# Patient Record
Sex: FEMALE | Race: BLACK OR AFRICAN AMERICAN | NOT HISPANIC OR LATINO | Employment: FULL TIME | URBAN - METROPOLITAN AREA
[De-identification: names, ages, dates, MRNs, and addresses within clinical notes are randomized per-mention and may not be internally consistent; named-entity substitution may affect disease eponyms.]

---

## 2024-08-04 ENCOUNTER — OFFICE VISIT (OUTPATIENT)
Dept: URGENT CARE | Facility: URGENT CARE | Age: 39
End: 2024-08-04
Payer: COMMERCIAL

## 2024-08-04 VITALS
TEMPERATURE: 97 F | SYSTOLIC BLOOD PRESSURE: 120 MMHG | DIASTOLIC BLOOD PRESSURE: 78 MMHG | WEIGHT: 191 LBS | HEART RATE: 71 BPM | OXYGEN SATURATION: 99 %

## 2024-08-04 DIAGNOSIS — N89.8 VAGINAL ITCHING: Primary | ICD-10-CM

## 2024-08-04 LAB
ALBUMIN UR-MCNC: NEGATIVE MG/DL
APPEARANCE UR: ABNORMAL
BILIRUB UR QL STRIP: NEGATIVE
CLUE CELLS: NORMAL
COLOR UR AUTO: YELLOW
GLUCOSE UR STRIP-MCNC: NEGATIVE MG/DL
HGB UR QL STRIP: ABNORMAL
KETONES UR STRIP-MCNC: NEGATIVE MG/DL
LEUKOCYTE ESTERASE UR QL STRIP: NEGATIVE
NITRATE UR QL: NEGATIVE
PH UR STRIP: 5.5 [PH] (ref 5–7)
RBC #/AREA URNS AUTO: ABNORMAL /HPF
SP GR UR STRIP: >=1.03 (ref 1–1.03)
SQUAMOUS #/AREA URNS AUTO: ABNORMAL /LPF
TRICHOMONAS, WET PREP: NORMAL
UROBILINOGEN UR STRIP-ACNC: 1 E.U./DL
WBC #/AREA URNS AUTO: ABNORMAL /HPF
WBC'S/HIGH POWER FIELD, WET PREP: NORMAL
YEAST, WET PREP: NORMAL

## 2024-08-04 PROCEDURE — 99203 OFFICE O/P NEW LOW 30 MIN: CPT | Performed by: PHYSICIAN ASSISTANT

## 2024-08-04 PROCEDURE — 87591 N.GONORRHOEAE DNA AMP PROB: CPT | Performed by: PHYSICIAN ASSISTANT

## 2024-08-04 PROCEDURE — 81001 URINALYSIS AUTO W/SCOPE: CPT

## 2024-08-04 PROCEDURE — 87210 SMEAR WET MOUNT SALINE/INK: CPT

## 2024-08-04 PROCEDURE — 87491 CHLMYD TRACH DNA AMP PROBE: CPT | Performed by: PHYSICIAN ASSISTANT

## 2024-08-04 RX ORDER — CIPROFLOXACIN 500 MG/1
1 TABLET, FILM COATED ORAL 2 TIMES DAILY
COMMUNITY
End: 2024-08-04

## 2024-08-04 RX ORDER — AMOXICILLIN 875 MG
875 TABLET ORAL 2 TIMES DAILY
COMMUNITY
End: 2024-08-04

## 2024-08-04 RX ORDER — CEPHALEXIN 500 MG/1
500 CAPSULE ORAL 2 TIMES DAILY
COMMUNITY
End: 2024-08-04

## 2024-08-04 RX ORDER — FLUCONAZOLE 150 MG/1
150 TABLET ORAL
Qty: 3 TABLET | Refills: 0 | Status: SHIPPED | OUTPATIENT
Start: 2024-08-04

## 2024-08-04 NOTE — PROGRESS NOTES
SUBJECTIVE:  Donna Juan is a 38 year old female comes in with concerns for yeast infection.  Patient states that for the past 3 to 4 days she has had itching along with vaginal irritation and a small amount of white discharge.  She just completed a course of antibiotics for UTI then symptoms develop.  She has tried over-the-counter yeast cream with no improvement last dosing last night but did put topical cream on today.  No concerns for STDs but would like to be tested.  She denies any other symptoms at this time.    No past medical history on file.  There is no problem list on file for this patient.    No current outpatient medications on file.     No current facility-administered medications for this visit.     Social History     Socioeconomic History    Marital status: Single     Spouse name: Not on file    Number of children: Not on file    Years of education: Not on file    Highest education level: Not on file   Occupational History    Not on file   Tobacco Use    Smoking status: Not on file    Smokeless tobacco: Not on file   Substance and Sexual Activity    Alcohol use: Not on file    Drug use: Not on file    Sexual activity: Not on file   Other Topics Concern    Not on file   Social History Narrative    Not on file     Social Determinants of Health     Financial Resource Strain: Medium Risk (3/27/2023)    Received from [facility]     Overall Financial Resource Strain (CARDIA)     Difficulty of Paying Living Expenses: Somewhat hard   Food Insecurity: Not on file   Transportation Needs: Not on file   Physical Activity: Inactive (3/27/2023)    Received from [facility]     Exercise Vital Sign     Days of Exercise per Week: 0 days     Minutes of Exercise per Session: 0 min   Stress: Stress Concern Present (3/27/2023)    Received from [facility]     [place] [employer] - Occupational Stress Questionnaire     Feeling of Stress : Very much   Social  Connections: Unknown (5/22/2023)    Received from [facility] [facility] [employer] [facility]    Social Connections     Frequency of Communication with Friends and Family: Not on file   Interpersonal Safety: Not on file   Housing Stability: Not on file     ROS negative other than stated above    Exam:  GENERAL APPEARANCE: healthy, alert and no distress  RESP: lungs clear to auscultation - no rales, rhonchi or wheezes  CV: regular rates and rhythm, normal S1 S2, no S3 or S4 and no murmur, click or rub -  ABDOMEN: Nontender no CVA tenderness  GU_female: Self swab obtained  SKIN: no suspicious lesions or rashes    Results for orders placed or performed in visit on 08/04/24   UA Macroscopic with reflex to Microscopic and Culture - Clinic Collect     Status: Abnormal    Specimen: Urine, Midstream   Result Value Ref Range    Color Urine Yellow Colorless, Straw, Light Yellow, Yellow    Appearance Urine Slightly Cloudy (A) Clear    Glucose Urine Negative Negative mg/dL    Bilirubin Urine Negative Negative    Ketones Urine Negative Negative mg/dL    Specific Gravity Urine >=1.030 1.003 - 1.035    Blood Urine Trace (A) Negative    pH Urine 5.5 5.0 - 7.0    Protein Albumin Urine Negative Negative mg/dL    Urobilinogen Urine 1.0 0.2, 1.0 E.U./dL    Nitrite Urine Negative Negative    Leukocyte Esterase Urine Negative Negative   UA Microscopic with Reflex to Culture     Status: Abnormal   Result Value Ref Range    RBC Urine None Seen 0-2 /HPF /HPF    WBC Urine 0-5 0-5 /HPF /HPF    Squamous Epithelials Urine Moderate (A) None Seen /LPF    Narrative    Urine Culture not indicated   Wet prep - Clinic Collect     Status: Normal    Specimen: Vagina; Swab   Result Value Ref Range    Trichomonas Absent Absent    Yeast Absent Absent    Clue Cells Absent Absent    WBCs/high power field None None     GC - pending results.      assessment/plan:  (N89.8) Vaginal itching  (primary encounter diagnosis)  Comment:   Plan: Wet prep - Clinic  Collect, UA Macroscopic with         reflex to Microscopic and Culture - Clinic         Collect, Chlamydia & Gonorrhea by PCR,         GICH/Range - Clinic Collect, UA Microscopic         with Reflex to Culture          Patient with vaginal itching along with discharge in setting of recent antibiotic use.  She has used some over-the-counter medication with minimal relief.  Urine is clear.  No clear yeast infection based off the wet prep but may be partial treatment and risk due to recent antibiotic use.  Will cover with Diflucan.  Hygiene measures were reviewed.  GC is pending.  Will follow-up with primary if symptoms worsen or new symptoms develop.

## 2024-08-05 ENCOUNTER — NURSE TRIAGE (OUTPATIENT)
Dept: PEDIATRICS | Facility: CLINIC | Age: 39
End: 2024-08-05
Payer: COMMERCIAL

## 2024-08-05 LAB
C TRACH DNA SPEC QL PROBE+SIG AMP: NEGATIVE
N GONORRHOEA DNA SPEC QL NAA+PROBE: NEGATIVE

## 2024-08-05 NOTE — TELEPHONE ENCOUNTER
Seen in UC yesterday. Brown discharge with small clots, small pea size elongated today.     Has abdominal pain above pelvic bone, started today.     Last period was 7-8 months.    She is unsure if this is the start of a period.    Patient reports she has been using the   3 day vaginal cream 2% for the outer itch but wondering if there is anything else she could try. She itched and her skin is raw and burns with urination.     Advised could try a water bottle and then patting after urinating. Advised to keep the area dry.      IF not better in a few days, recommended to see PCP. Patient voiced understanding and agreement with plan.    ANGELICA Coreas on 8/5/2024 at 5:05 PM      Reason for Disposition   All other vaginal symptoms  (Exception: Feels like prior yeast infection, minor abrasion, mild rash < 24 hour duration, mild itching.)    Additional Information   Negative: Followed a genital area injury (e.g., vagina, vulva)   Negative: Vaginal bleeding is main symptom   Negative: Vaginal discharge is main symptom   Negative: Pain or burning with passing urine (urination) is main symptom   Negative: Menstrual cramps is main symptom   Negative: Abdomen pain is main symptom   Negative: Pubic lice suspected   Negative: Itching or rash of female genital area (e.g., labia, vagina, vulva)   Negative: Pregnant and labor suspected   Negative: Sounds like a life-threatening emergency to the triager   Negative: Patient sounds very sick or weak to the triager   Negative: SEVERE pain and not improved 2 hours after pain medicine   Negative: Genital area looks infected (e.g., draining sore, spreading redness) and fever   Negative: Something is hanging out of the vagina and can't easily be pushed back inside   Negative: MILD-MODERATE pain and present > 24 hours  (Exception: Chronic pain.)   Negative: Genital area looks infected (e.g., draining sore, spreading redness)   Negative: Rash with painful tiny water blisters   Negative:  "MODERATE-SEVERE itching (i.e., interferes with school, work, or sleep)   Negative: Rash (e.g., redness, tiny bumps, sore) of genital area and present > 24 hours   Negative: Tender lump (swelling or \"ball\") at vaginal opening   Negative: Symptoms of a yeast infection (i.e., itchy, white discharge, not bad smelling) and not improved > 3 days following Care Advice   Negative: Vaginal itching and not improved > 3 days following Care Advice   Negative: Vaginal odor (bad smell) not improved > 3 days following Care Advice   Negative: Patient is worried they have a sexually transmitted infection (STI)   Negative: Patient wants to be seen   Negative: Feels like something inside is falling out of vagina (e.g., pressure, heaviness, fullness)   Negative: Vaginal dryness or itching and nearing menopause or after menopause   Negative: Pain with sexual intercourse (dyspareunia)  (Exception: Feels like prior yeast infection, minor abrasion, minor rash < 24 hour duration, mild itching.)   Negative: Pain in genital area is a chronic symptom (recurrent or ongoing AND present > 4 weeks)    Protocols used: Vaginal Symptoms-A-OH    "

## 2024-09-30 ENCOUNTER — HOSPITAL ENCOUNTER (EMERGENCY)
Facility: CLINIC | Age: 39
Discharge: HOME | End: 2024-09-30
Attending: EMERGENCY MEDICINE | Admitting: EMERGENCY MEDICINE
Payer: COMMERCIAL

## 2024-09-30 VITALS
TEMPERATURE: 98.1 F | WEIGHT: 190 LBS | BODY MASS INDEX: 35.87 KG/M2 | RESPIRATION RATE: 18 BRPM | HEIGHT: 61 IN | HEART RATE: 54 BPM | OXYGEN SATURATION: 100 % | SYSTOLIC BLOOD PRESSURE: 105 MMHG | DIASTOLIC BLOOD PRESSURE: 73 MMHG

## 2024-09-30 DIAGNOSIS — M62.830 BACK MUSCLE SPASM: ICD-10-CM

## 2024-09-30 LAB
ANION GAP SERPL CALCULATED.3IONS-SCNC: 13 MMOL/L (ref 7–15)
BASOPHILS # BLD AUTO: 0 10E3/UL (ref 0–0.2)
BASOPHILS NFR BLD AUTO: 0 %
BUN SERPL-MCNC: 16 MG/DL (ref 6–20)
CALCIUM SERPL-MCNC: 8.8 MG/DL (ref 8.8–10.4)
CHLORIDE SERPL-SCNC: 105 MMOL/L (ref 98–107)
CREAT SERPL-MCNC: 1.06 MG/DL (ref 0.51–0.95)
EGFRCR SERPLBLD CKD-EPI 2021: 68 ML/MIN/1.73M2
EOSINOPHIL # BLD AUTO: 0.1 10E3/UL (ref 0–0.7)
EOSINOPHIL NFR BLD AUTO: 1 %
ERYTHROCYTE [DISTWIDTH] IN BLOOD BY AUTOMATED COUNT: 12.6 % (ref 10–15)
GLUCOSE SERPL-MCNC: 120 MG/DL (ref 70–99)
HCO3 SERPL-SCNC: 20 MMOL/L (ref 22–29)
HCT VFR BLD AUTO: 39.7 % (ref 35–47)
HGB BLD-MCNC: 13 G/DL (ref 11.7–15.7)
IMM GRANULOCYTES # BLD: 0 10E3/UL
IMM GRANULOCYTES NFR BLD: 0 %
LYMPHOCYTES # BLD AUTO: 3.4 10E3/UL (ref 0.8–5.3)
LYMPHOCYTES NFR BLD AUTO: 49 %
MCH RBC QN AUTO: 33.1 PG (ref 26.5–33)
MCHC RBC AUTO-ENTMCNC: 32.7 G/DL (ref 31.5–36.5)
MCV RBC AUTO: 101 FL (ref 78–100)
MONOCYTES # BLD AUTO: 0.6 10E3/UL (ref 0–1.3)
MONOCYTES NFR BLD AUTO: 8 %
NEUTROPHILS # BLD AUTO: 2.8 10E3/UL (ref 1.6–8.3)
NEUTROPHILS NFR BLD AUTO: 41 %
NRBC # BLD AUTO: 0 10E3/UL
NRBC BLD AUTO-RTO: 0 /100
PLATELET # BLD AUTO: 263 10E3/UL (ref 150–450)
POTASSIUM SERPL-SCNC: 3.8 MMOL/L (ref 3.4–5.3)
RBC # BLD AUTO: 3.93 10E6/UL (ref 3.8–5.2)
SODIUM SERPL-SCNC: 138 MMOL/L (ref 135–145)
TROPONIN T SERPL HS-MCNC: <6 NG/L
WBC # BLD AUTO: 6.9 10E3/UL (ref 4–11)

## 2024-09-30 PROCEDURE — 80048 BASIC METABOLIC PNL TOTAL CA: CPT | Performed by: EMERGENCY MEDICINE

## 2024-09-30 PROCEDURE — 250N000011 HC RX IP 250 OP 636: Performed by: EMERGENCY MEDICINE

## 2024-09-30 PROCEDURE — 250N000013 HC RX MED GY IP 250 OP 250 PS 637: Performed by: EMERGENCY MEDICINE

## 2024-09-30 PROCEDURE — 99284 EMERGENCY DEPT VISIT MOD MDM: CPT | Mod: 25

## 2024-09-30 PROCEDURE — 93005 ELECTROCARDIOGRAM TRACING: CPT

## 2024-09-30 PROCEDURE — 36415 COLL VENOUS BLD VENIPUNCTURE: CPT | Performed by: EMERGENCY MEDICINE

## 2024-09-30 PROCEDURE — 96375 TX/PRO/DX INJ NEW DRUG ADDON: CPT

## 2024-09-30 PROCEDURE — 85025 COMPLETE CBC W/AUTO DIFF WBC: CPT | Performed by: EMERGENCY MEDICINE

## 2024-09-30 PROCEDURE — 96374 THER/PROPH/DIAG INJ IV PUSH: CPT

## 2024-09-30 PROCEDURE — 84484 ASSAY OF TROPONIN QUANT: CPT | Performed by: EMERGENCY MEDICINE

## 2024-09-30 RX ORDER — ACETAMINOPHEN 500 MG
1000 TABLET ORAL ONCE
Status: COMPLETED | OUTPATIENT
Start: 2024-09-30 | End: 2024-09-30

## 2024-09-30 RX ORDER — LIDOCAINE 4 G/G
1 PATCH TOPICAL ONCE
Status: DISCONTINUED | OUTPATIENT
Start: 2024-09-30 | End: 2024-09-30 | Stop reason: HOSPADM

## 2024-09-30 RX ORDER — CYCLOBENZAPRINE HCL 10 MG
10 TABLET ORAL ONCE
Status: COMPLETED | OUTPATIENT
Start: 2024-09-30 | End: 2024-09-30

## 2024-09-30 RX ORDER — HYDROMORPHONE HYDROCHLORIDE 1 MG/ML
0.5 INJECTION, SOLUTION INTRAMUSCULAR; INTRAVENOUS; SUBCUTANEOUS ONCE
Status: COMPLETED | OUTPATIENT
Start: 2024-09-30 | End: 2024-09-30

## 2024-09-30 RX ORDER — KETOROLAC TROMETHAMINE 15 MG/ML
15 INJECTION, SOLUTION INTRAMUSCULAR; INTRAVENOUS ONCE
Status: COMPLETED | OUTPATIENT
Start: 2024-09-30 | End: 2024-09-30

## 2024-09-30 RX ORDER — CYCLOBENZAPRINE HCL 10 MG
10 TABLET ORAL 3 TIMES DAILY PRN
Qty: 20 TABLET | Refills: 0 | Status: SHIPPED | OUTPATIENT
Start: 2024-09-30 | End: 2024-10-07

## 2024-09-30 RX ADMIN — HYDROMORPHONE HYDROCHLORIDE 0.5 MG: 1 INJECTION, SOLUTION INTRAMUSCULAR; INTRAVENOUS; SUBCUTANEOUS at 19:44

## 2024-09-30 RX ADMIN — KETOROLAC TROMETHAMINE 15 MG: 15 INJECTION, SOLUTION INTRAMUSCULAR; INTRAVENOUS at 19:35

## 2024-09-30 RX ADMIN — LIDOCAINE 1 PATCH: 4 PATCH TOPICAL at 21:50

## 2024-09-30 RX ADMIN — ACETAMINOPHEN 1000 MG: 500 TABLET ORAL at 21:49

## 2024-09-30 RX ADMIN — CYCLOBENZAPRINE 10 MG: 10 TABLET, FILM COATED ORAL at 19:44

## 2024-09-30 ASSESSMENT — ACTIVITIES OF DAILY LIVING (ADL)
ADLS_ACUITY_SCORE: 35

## 2024-09-30 ASSESSMENT — COLUMBIA-SUICIDE SEVERITY RATING SCALE - C-SSRS
6. HAVE YOU EVER DONE ANYTHING, STARTED TO DO ANYTHING, OR PREPARED TO DO ANYTHING TO END YOUR LIFE?: NO
2. HAVE YOU ACTUALLY HAD ANY THOUGHTS OF KILLING YOURSELF IN THE PAST MONTH?: NO
1. IN THE PAST MONTH, HAVE YOU WISHED YOU WERE DEAD OR WISHED YOU COULD GO TO SLEEP AND NOT WAKE UP?: NO

## 2024-09-30 NOTE — ED PROVIDER NOTES
"  Emergency Department Note      History of Present Illness     Chief Complaint   Back Pain      HPI   Donna Juan is a 38 year old female who presents with a complaint of back pain. The patient reports that around 3 pm, she had a sudden onset of pain in her upper left back while she was driving. She was unable to move due to the pain. Now, she describes it as a pressure that worsens when she moves. She also notes chest pain, as well as pain upon inhalation. She notes that she has back spasms 1-2x a year, and has never had chest pain associated with one. Last time, the pain improved with use of a muscle relaxer. She denies any numbness, weakness, or pain in her upper or lower extremities. She denies any incontinence.     Independent Historian   None    Review of External Notes     Past Medical History     Medical History and Problem List   Recurrent UTI  Bran aneurysm  Right ovarian cyst   Pyelonephritis    Medications   Nicotine patch  Cephalexin  Methocarbamol  Diazepam  Medroxyprogesterone    Surgical History   Appendectomy   Right shoulder surgery       Physical Exam     Patient Vitals for the past 24 hrs:   BP Temp Temp src Pulse Resp SpO2 Height Weight   09/30/24 2129 104/64 -- -- 55 -- 99 % -- --   09/30/24 2101 113/77 -- -- 54 -- 100 % -- --   09/30/24 2031 99/70 -- -- 55 -- 99 % -- --   09/30/24 2001 107/67 -- -- 52 -- 99 % -- --   09/30/24 1936 -- 98.1  F (36.7  C) Oral -- 18 -- 1.549 m (5' 1\") 86.2 kg (190 lb)   09/30/24 1930 107/62 -- -- 56 -- 99 % -- --   09/30/24 1900 109/75 -- -- 61 -- 100 % -- --   09/30/24 1853 118/77 -- -- 64 -- -- -- --     Physical Exam  Constitutional: Well appearing.  HEENT: Atraumatic.   Moist mucous membranes.  Neck: Soft.  Supple.  No JVD.  Cardiac: Regular rate and rhythm.  No murmur or rub.  Respiratory: Clear to auscultation bilaterally.  No respiratory distress.   Abdomen: Soft and nontender.  No guarding.  Nondistended.  Musculoskeletal: Mild tenderness to the " left thoracic paraspinal muscle area. No edema.  Normal range of motion.  Neurologic: Alert and oriented x3.  Normal tone and bulk.   5/5 strength in bilateral upper and lower extremities.  Sensation to light touch intact throughout.  Normal gait.  Skin: No rashes.  No edema.  Psych: Normal affect.  Normal behavior.            Diagnostics     Lab Results   Labs Ordered and Resulted from Time of ED Arrival to Time of ED Departure   BASIC METABOLIC PANEL - Abnormal       Result Value    Sodium 138      Potassium 3.8      Chloride 105      Carbon Dioxide (CO2) 20 (*)     Anion Gap 13      Urea Nitrogen 16.0      Creatinine 1.06 (*)     GFR Estimate 68      Calcium 8.8      Glucose 120 (*)    CBC WITH PLATELETS AND DIFFERENTIAL - Abnormal    WBC Count 6.9      RBC Count 3.93      Hemoglobin 13.0      Hematocrit 39.7       (*)     MCH 33.1 (*)     MCHC 32.7      RDW 12.6      Platelet Count 263      % Neutrophils 41      % Lymphocytes 49      % Monocytes 8      % Eosinophils 1      % Basophils 0      % Immature Granulocytes 0      NRBCs per 100 WBC 0      Absolute Neutrophils 2.8      Absolute Lymphocytes 3.4      Absolute Monocytes 0.6      Absolute Eosinophils 0.1      Absolute Basophils 0.0      Absolute Immature Granulocytes 0.0      Absolute NRBCs 0.0     TROPONIN T, HIGH SENSITIVITY - Normal    Troponin T, High Sensitivity <6         Imaging   None    EKG   ECG results from 09/30/24   EKG 12-lead, tracing only     Value    Systolic Blood Pressure     Diastolic Blood Pressure     Ventricular Rate 50    Atrial Rate 50    VA Interval 160    QRS Duration 76        QTc 379    P Axis 31    R AXIS 49    T Axis 43    Interpretation ECG      Sinus bradycardia  Read by Dr Clement [id number]          Independent Interpretation   None    ED Course      Medications Administered   Medications   Lidocaine (LIDOCARE) 4 % Patch 1 patch (has no administration in time range)   acetaminophen (TYLENOL) tablet 1,000 mg (has no  administration in time range)   ketorolac (TORADOL) injection 15 mg (15 mg Intravenous $Given 9/30/24 1935)   HYDROmorphone (PF) (DILAUDID) injection 0.5 mg (0.5 mg Intravenous $Given 9/30/24 1944)   cyclobenzaprine (FLEXERIL) tablet 10 mg (10 mg Oral $Given 9/30/24 1944)       Procedures   None     Discussion of Management   None    ED Course   ED Course as of 09/30/24 2148   Mon Sep 30, 2024   1902 I initially assessed the patient and obtained the history and physical exam.        Additional Documentation  None    Medical Decision Making / Diagnosis     CMS Diagnoses: None    MIPS       None    MDM   Donna Juan is a 38 year old female who is afebrile and hemodynamically stable.  She has muscle spasms in the left thoracic back.  She has no red flag back pain symptoms and neurologically intact.  No trauma.  She now has some wraparound chest pain and EKG without ischemic changes.  Troponin undetectable making ACS excluded.  She is low risk Wells criteria for PE and has no tachycardia, shortness of breath, and has an SpO2 100% making a PE exceedingly unlikely.  No indication for imaging at this time and she is in agreement.  She is given the above intervention with improvement and feels comfortable discharging home.  She go home with muscle relaxer and rest and close primary care follow-up.  Strict return precautions were given, her questions were answered, and she was in no distress at time of discharge.    Disposition   The patient was discharged.     Diagnosis     ICD-10-CM    1. Back muscle spasm  M62.830            Discharge Medications   New Prescriptions    CYCLOBENZAPRINE (FLEXERIL) 10 MG TABLET    Take 1 tablet (10 mg) by mouth 3 times daily as needed for muscle spasms.     Scribe Disclosure:  I, Monica Reyna, am serving as a scribe at 6:58 PM on 9/30/2024 to document services personally performed by Rohan Clement MD based on my observations and the provider's statements to me.      Racquel  Rohan BUSTILLO MD  10/02/24 1010

## 2024-09-30 NOTE — ED TRIAGE NOTES
BIBA from work, pt works as a  and started having back spasms. They increased in severity and pt called 911.      Triage Assessment (Adult)       Row Name 09/30/24 6088          Triage Assessment    Airway WDL WDL        Respiratory WDL    Respiratory WDL WDL        Skin Circulation/Temperature WDL    Skin Circulation/Temperature WDL WDL        Cardiac WDL    Cardiac WDL WDL        Peripheral/Neurovascular WDL    Peripheral Neurovascular WDL WDL        Cognitive/Neuro/Behavioral WDL    Cognitive/Neuro/Behavioral WDL WDL

## 2024-10-01 LAB
ATRIAL RATE - MUSE: 50 BPM
DIASTOLIC BLOOD PRESSURE - MUSE: NORMAL MMHG
INTERPRETATION ECG - MUSE: NORMAL
P AXIS - MUSE: 31 DEGREES
PR INTERVAL - MUSE: 160 MS
QRS DURATION - MUSE: 76 MS
QT - MUSE: 416 MS
QTC - MUSE: 379 MS
R AXIS - MUSE: 49 DEGREES
SYSTOLIC BLOOD PRESSURE - MUSE: NORMAL MMHG
T AXIS - MUSE: 43 DEGREES
VENTRICULAR RATE- MUSE: 50 BPM